# Patient Record
Sex: MALE | Race: WHITE
[De-identification: names, ages, dates, MRNs, and addresses within clinical notes are randomized per-mention and may not be internally consistent; named-entity substitution may affect disease eponyms.]

---

## 2018-04-21 ENCOUNTER — HOSPITAL ENCOUNTER (OUTPATIENT)
Dept: HOSPITAL 5 - XRAY | Age: 28
Discharge: HOME | End: 2018-04-21
Attending: NURSE PRACTITIONER
Payer: SELF-PAY

## 2018-04-21 DIAGNOSIS — M54.5: ICD-10-CM
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
## 2018-04-21 DIAGNOSIS — M53.3: Primary | ICD-10-CM
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
## 2018-04-21 DIAGNOSIS — M25.551: ICD-10-CM
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
## 2018-04-21 PROCEDURE — 72100 X-RAY EXAM L-S SPINE 2/3 VWS: CPT

## 2018-04-21 PROCEDURE — 72220 X-RAY EXAM SACRUM TAILBONE: CPT

## 2018-04-22 NOTE — XRAY REPORT
XRAY SACRUM AND COCCYX TWO VIEWS: 04/21/18 12:42:00



CLINICAL: Fall and pain.



FINDINGS: The sacrum and coccyx are intact.  No fracture or bone 

lesion.  Normal sacroiliac joints. Normal soft tissues.



IMPRESSION: Normal sacrum and coccyx.

## 2018-04-22 NOTE — XRAY REPORT
XRAY RIGHT HIP TWO VIEWS: 04/21/18 12:42:00





CLINICAL: Pain from fall.



FINDINGS: No fracture or dislocation.Normal bones, joints and soft 

tissues.



IMPRESSION: Normal.

## 2018-04-22 NOTE — XRAY REPORT
XRAY LUMBAR SPINE THREE VIEWS: 04/21/18 12:42:00





CLINICAL: Fall and back pain.



FINDINGS: Normal vertebral body height, alignment and disk spaces.  The 

pedicles are intact.  No fracture.  Normal soft tissues.



IMPRESSION: Normal.